# Patient Record
Sex: MALE | Race: BLACK OR AFRICAN AMERICAN | NOT HISPANIC OR LATINO | URBAN - METROPOLITAN AREA
[De-identification: names, ages, dates, MRNs, and addresses within clinical notes are randomized per-mention and may not be internally consistent; named-entity substitution may affect disease eponyms.]

---

## 2024-10-27 ENCOUNTER — EMERGENCY (EMERGENCY)
Facility: HOSPITAL | Age: 34
LOS: 1 days | Discharge: HOME | End: 2024-10-27
Attending: EMERGENCY MEDICINE | Admitting: EMERGENCY MEDICINE

## 2024-10-27 VITALS
DIASTOLIC BLOOD PRESSURE: 67 MMHG | TEMPERATURE: 98 F | SYSTOLIC BLOOD PRESSURE: 117 MMHG | HEART RATE: 123 BPM | WEIGHT: 315 LBS | OXYGEN SATURATION: 98 % | RESPIRATION RATE: 18 BRPM

## 2024-10-27 VITALS — HEART RATE: 124 BPM

## 2024-10-27 PROCEDURE — 99284 EMERGENCY DEPT VISIT MOD MDM: CPT

## 2024-10-27 RX ORDER — CLOTRIMAZOLE 100 MG
1 TABLET VAGINAL ONCE
Refills: 0 | Status: COMPLETED | OUTPATIENT
Start: 2024-10-27 | End: 2024-10-27

## 2024-10-27 RX ADMIN — Medication 1 APPLICATION(S): at 22:33

## 2024-10-27 NOTE — ED PROVIDER NOTE - NSFOLLOWUPINSTRUCTIONS_ED_ALL_ED_FT
USE THE CREAM TWICE A DAY FOR TWO WEEKS    RETURN TO THE EMERGENCY ROOM FOR:  FEVER  WORSENING RASH  AREAS OF PUS, SWELLING, PAIN     FUNGUS TO THE SKIN IN THE GROIN (TINEA CRURIS)  THIS  is an itchy rash in the groin and upper thigh area. It is a skin infection that is caused by a type of germ (fungus). The rash usually goes away in 2–3 weeks with treatment.    What are the causes?  This condition may be caused by:  Touching a fungal infection elsewhere on your body, such as athlete's foot, and then touching your groin area.  Sharing towels or clothing, such as socks or shoes, with someone who has a fungal infection.  What increases the risk?  This condition is most common in men and adolescent boys. You are also more likely to develop the condition if you:  Are in a hot, humid climate.  Wear tight-fitting clothes or wet bathing suits for a long time.  Play sports.  Are overweight.  Have diabetes.  Have a weakened body defense system (immune system).  Sweat a lot.  What are the signs or symptoms?  Outline of a male's lower body with a close-up view of a rash in the groin area.  Symptoms of this condition include:  A red, pink, or brown rash in the groin area.  There may be blisters.  The rash may spread to your thighs, the opening between the butt (anus), and the butt.  Dry and scaly skin on or around the rash.  Itchiness.  How is this treated?  Treatment for this condition may include:  Medicine to kill the fungus (antifungal). This may be one of the following:  Skin cream.  Ointment.  Powder.  Medicine that you take by mouth.  Skin cream or ointment to reduce itching.  Lifestyle changes, such as wearing looser clothing and caring for your skin.  Follow these instructions at home:  Skin care    Use skin creams, ointments, or powders exactly as told by your doctor.  Wear clothes that are loose. Clothes should not rub against your groin area. Men should wear boxer shorts or loose underwear.  Keep your groin area clean and dry.  Change your underwear every day.  Change out of wet bathing suits as soon as you can.  After bathing, use a different towel to dry your groin area. Dry the area gently and completely.  Avoid hot baths and showers. Hot water can make itching worse.  Do not scratch the area.  General instructions    Take and apply over-the-counter and prescription medicines only as told by your doctor.  Do not share towels or clothing with other people.  Wash your hands often with soap and water for at least 20 seconds, especially after touching your groin area. If you cannot use soap and water, use hand .  When at the gym:  Always wear shoes, especially in the shower and around the swimming pool.  Keep any cuts covered.  Clean any mats or equipment before using them.  Shower as soon as you are done working out.  Keep all follow-up visits.  Contact a doctor if:  Your rash:  Gets worse.  Does not get better after 2 weeks of treatment.  Spreads.  Comes back after treatment is done.  You have a fever.  You have new or more redness, swelling, or pain around your rash.  You have fluid, blood, or pus coming from your rash.  Document Revised: 03/08/2022 Document Reviewed: 03/08/2022  Elsevier Patient Education © 2024 [employer]

## 2024-10-27 NOTE — ED PROVIDER NOTE - PATIENT PORTAL LINK FT
You can access the FollowMyHealth Patient Portal offered by [facility] by registering at the following website: [email or web address]. By joining [employer]’s FollowMyHealth portal, you will also be able to view your health information using other applications (apps) compatible with our system.

## 2024-10-27 NOTE — ED ADULT TRIAGE NOTE - CHIEF COMPLAINT QUOTE
Pt presents to ed reporting rash on bilateral thighs x 2 days. denies fever. attempted vaseline PTA with no relief in symptoms

## 2024-11-10 ENCOUNTER — EMERGENCY (EMERGENCY)
Facility: HOSPITAL | Age: 34
LOS: 1 days | Discharge: HOME | End: 2024-11-10
Admitting: EMERGENCY MEDICINE
Payer: SELF-PAY

## 2024-11-10 VITALS
DIASTOLIC BLOOD PRESSURE: 104 MMHG | RESPIRATION RATE: 16 BRPM | OXYGEN SATURATION: 99 % | HEART RATE: 99 BPM | SYSTOLIC BLOOD PRESSURE: 164 MMHG | TEMPERATURE: 98 F | WEIGHT: 300.05 LBS

## 2024-11-10 PROCEDURE — 99053 MED SERV 10PM-8AM 24 HR FAC: CPT

## 2024-11-10 PROCEDURE — 99283 EMERGENCY DEPT VISIT LOW MDM: CPT

## 2024-11-10 RX ORDER — ACETAMINOPHEN 500 MG
650 TABLET ORAL ONCE
Refills: 0 | Status: COMPLETED | OUTPATIENT
Start: 2024-11-10 | End: 2024-11-10

## 2024-11-10 RX ORDER — IBUPROFEN 200 MG
600 TABLET ORAL ONCE
Refills: 0 | Status: COMPLETED | OUTPATIENT
Start: 2024-11-10 | End: 2024-11-10

## 2024-11-10 RX ADMIN — Medication 600 MILLIGRAM(S): at 03:48

## 2024-11-10 RX ADMIN — Medication 650 MILLIGRAM(S): at 03:47

## 2024-11-10 NOTE — ED PROVIDER NOTE - NSFOLLOWUPINSTRUCTIONS_ED_ALL_ED_FT
For pain: Take Motrin (also sold as Advil or Ibuprofen) 400-600 mg (two or three 200 mg over the counter pills) every 8 hours as needed for moderate pain or fevers-- take with food; do not take for more than 5 consecutive days. Take Tylenol 650mg (Two regular strength 325 mg pills) every 4-6 hours or two extra strength 500mg tablets every 8 hours as needed for pain or fevers. Do not exceed 1000mg at one time or 4000mg in a 24 hour period.   THESE ARE MEDICATIONS YOU CAN BUY AT A LOCAL PHARMACY.    Please make sure that you are wearing comfortable shoes.    Continue to apply cream to treat for the fungal infection on your feet.    Keep extremity elevated and wrapped.  Apply cool compresses to affected area for 15 minutes, 3-4 times per day.    If pain does not improve, please see a podiatrist.    Plantar Fasciitis    WHAT YOU NEED TO KNOW:    What is plantar fasciitis? Plantar fasciitis is swelling of the plantar fascia. The plantar fascia is a thick band of tissue that connects your heel bone to your toes. This part of your foot helps support the arch of your foot and absorbs shock. Tension and stress can cause small tears on the thick band of tissue. These small tears can grow larger with repeated stretching and tearing. The band of tissue can become swollen and painful.  Plantar Fasciitis    What increases my risk for plantar fasciitis?    Being 40 to 60 years old or a woman    Pregnancy    Obesity    High impact exercises such as running    Activities or sports that involve jumping, such as basketball    A sudden increase in the intensity of an activity    Flat feet, high arches, or feet that rotate inward when you walk or run    Tight calf muscles and tendons    Wearing shoes that do not support your feet, such as shoes that are worn out    Standing on hard surfaces for long periods of time  What are the signs and symptoms of plantar fasciitis?    Pain on the bottom of your foot near your heel    Pain that is worse right after you get out of bed or after a long period of rest    Pain after activity    Stiffness in your foot    Heel swelling  How is plantar fasciitis diagnosed? Your healthcare provider will examine your foot and ask about your activities. He or she may check the movement of your foot and ankle. You may need an x-ray to check for a fracture or heel spur (bone growth on your heel).    How is plantar fasciitis treated?    Medicines may be given to decrease swelling and pain.    Shoe inserts, splints, or tape help support your foot and decrease stress on your plantar fascia. A night splint may help stretch your plantar fascia while you sleep.    Stretches and exercises can help decrease pain and swelling. They can also help strengthen the muscles that support your heel and foot.    Surgery may be needed when other treatments do not work. During surgery, the plantar fascia is  from your heel.  How can I manage my symptoms?    Wear your splint or shoe inserts as directed. You may need to wear a splint at night to keep your foot stretched while you sleep. This will help prevent sharp pain first thing in the morning. Shoe inserts will help decrease stress on your plantar fascia when you walk or exercise.    Apply ice on your plantar fascia. Ice helps decrease swelling and pain. Fill a water bottle with water and freeze it. Wrap a towel around the bottle or cover it with a pillow case. Roll the water bottle under your foot for 10 minutes each morning and evening.    Massage your plantar fascia as directed. This may help decrease swelling and pain. Roll a golf ball under your foot for 10 minutes. Repeat 3 times each day.    Go to physical therapy as directed. A physical therapist teaches you exercises to help improve movement and strength, and to decrease pain.  How can I help prevent plantar fasciitis?    Maintain a healthy weight. This will help decrease stress on your feet. Ask your healthcare provider what a healthy weight is for you. Ask him or her to help you create a weight loss plan, if needed.    Do low-impact exercises. Low-impact exercises decrease stress on your plantar fascia. Examples include swimming or bicycling.    Start new activities slowly. Increase the intensity and time gradually.    Wear shoes that fit well and support your arch. Replace your shoes before the padding or shock absorption wears out. Do not walk or  bare feet or sandals for long periods of time.  When should I call my doctor?    Your pain or swelling suddenly increases.    You develop knee, hip, or back pain.    You have questions or concerns about your condition or care.

## 2024-11-10 NOTE — ED PROVIDER NOTE - CLINICAL SUMMARY MEDICAL DECISION MAKING FREE TEXT BOX
34-year-old male with no pertinent past medical history presents to the ED complaining of bilateral foot pain, mainly on the bottom of his feet for the last 3 to 4 days.  Patient notes that pain is worse with ambulation and with movement.  Patient also reports pain with standing on toes.  Patient does note that he has been walking around more in the last few days.  Patient was seen here 5 days ago for a fungal infection on the bilateral feet, has been compliant with medications, reports that symptoms have improved.  Denies extremity paresthesia/weakness, redness around the feet, swelling over the feet or ankles, recent trauma or fall.  No known drug allergies. Pt requesting socks. Pt wears sneakers to walk around, however loose fit and worn down.    Patient currently afebrile, hemodynamically stable, spO2 100%. Based on history and physical, differentials include but are not limited to plantar fasciitis vs ligamentous strain. Atraumatic, low suspicion for fx vs contusion. Low suspicion for cellulitis or gout. No labs or imaging indicated. Will give pain meds and d/c with supportive care.

## 2024-11-10 NOTE — ED PROVIDER NOTE - DISCHARGE DATE
Pt called back and has been scheduled for sleep study follow up on 1/18/18 at 11:40 at the [facility] location.    10-Nov-2024

## 2024-11-10 NOTE — ED PROVIDER NOTE - OBJECTIVE STATEMENT
34-year-old male with no pertinent past medical history presents to the ED complaining of bilateral foot pain, mainly on the bottom of his feet for the last 3 to 4 days.  Patient notes that pain is worse with ambulation and with movement.  Patient also reports pain with standing on toes.  Patient does note that he has been walking around more in the last few days.  Patient was seen here 5 days ago for a fungal infection on the bilateral feet, has been compliant with medications, reports that symptoms have improved.  Denies extremity paresthesia/weakness, redness around the feet, swelling over the feet or ankles, recent trauma or fall.  No known drug allergies. Pt requesting socks. Pt wears sneakers to walk around, however loose fit and worn down.

## 2024-11-10 NOTE — ED PROVIDER NOTE - PHYSICAL EXAMINATION
General: Well appearing in no acute distress, alert and cooperative  Cardiac: Regular rate and regular rhythm, no murmurs  Resp: Unlabored respiratory effort, lungs CTAB, speaking in full sentences, no wheezes  MSK: No tenderness to palpation in bilateral feet.  No swelling, erythema or warmth noted to bilateral feet.  Full range of motion of bilateral ankles and toes on bilateral feet.  DP pulses intact and 2+ bilateral lower extremities.  Motor strength 5 out of 5 in bilateral lower extremities and sensation grossly intact.  Compartments soft.  Skin: Warm and dry, no rashes/abrasions/lacerations  Neuro: AO x 3, moves all extremities symmetrically, Motor strength 5/5 bilateral LE, sensation grossly intact. Normal gait.

## 2024-11-10 NOTE — ED PROVIDER NOTE - PATIENT PORTAL LINK FT
You can access the FollowMyHealth Patient Portal offered by [facility] by registering at the following website: [email or web address]. By joining [employer]’s FollowMyHealth portal, you will also be able to view your health information using other applications (apps) compatible with our system.

## 2024-11-10 NOTE — ED PROVIDER NOTE - CARE PROVIDER_API CALL
Brigitte Doherty  Podiatric Medicine and Surgery  [address] [place] [address], [address]  [place] [address]  Phone: [phone]  Fax: [phone]  Follow Up Time:

## 2024-11-12 DIAGNOSIS — M72.2 PLANTAR FASCIAL FIBROMATOSIS: ICD-10-CM

## 2024-11-12 DIAGNOSIS — M79.671 PAIN IN RIGHT FOOT: ICD-10-CM
